# Patient Record
(demographics unavailable — no encounter records)

---

## 2025-01-14 NOTE — HISTORY OF PRESENT ILLNESS
[FreeTextEntry1] : Ms. Gissel Denis was seen at the [facility] located in [place], [place], on 2025. The patient is a 33-year-old woman with gestational hypertension as diagnosed per OBGYN team. She was referred specifically for blood pressure management.   I had the opportunity of meeting Gissel for the first time today in clinic.   She was admitted to [facility] from 2025 to 2025 for induction of labor for Non reassuring fetal tracing. She underwent  delivery on 1/3/2025. She was discharged on nifedipine 60 mg daily. She was reportedly diagnosed with gestation hypertension.  She has a baby girl.   No prior reported (or documented) history myocardial infarction, coronary artery disease, arrhythmias, heart failure, myocarditis, pericarditis or valvular disease. No prior reported (or documented) history of diabetes mellitus or lipid disorder. Other past medical and surgical history listed below.   Occupation includes accounting. See other social and family history below.  Exercise: HIIT prior to this most recent pregnancy.

## 2025-01-14 NOTE — REASON FOR VISIT
[Hypertension] : hypertension [Spouse] : spouse [FreeTextEntry3] : Mark Morrison MD [FreeTextEntry1] : History was provided by patient and chart review.

## 2025-01-14 NOTE — CARDIOLOGY SUMMARY
[de-identified] : 1/14/2025 showed sinus rhythm with a ventricular rate of 90 bpm. IL interval 142 ms and QRS duration of 80 ms. Normal axis and intervals. QTc 422 ms. No chamber enlargement or hypertrophy. No ST/T changes.

## 2025-01-14 NOTE — DISCUSSION/SUMMARY
[EKG obtained to assist in diagnosis and management of assessed problem(s)] : EKG obtained to assist in diagnosis and management of assessed problem(s) [FreeTextEntry1] : Ms. Gissel Denis is a 33-year-old woman with  1. Reported gestational hypertension post CS on 1/3/2025. She was referred specifically for blood pressure management. She was diagnosed by OBGYN team at [facility].   - The patient appears to be doing well from a cardiovascular standpoint and denies angina. There is no evidence of acute decompensated heart failure or clinically significant arrhythmia or valvular heart disease at this time. NYHA Class I. The patient is a non-smoker. There is no reported family history of premature coronary artery disease. TTE not indicated at this time as she is asymptomatic and there is no murmur on exam. Her ECG is also normal.    - Her blood pressure today is fairly controlled. My nurse team will call her back in 1 week for a blood pressure check. I educated her on proper BP recording technique. Continue nifedipine 60 mg daily which is safe for breastfeeding. Gestational HTN is associated with elevated cardiovascular events later in life such as ischemic heart disease, stroke, heart failure and the development of diabetes mellitus.  - I recommended lifestyle modifications for long-term cardiovascular health, including adopting a Mediterranean diet pattern to lower cardiovascular disease risk. I discussed the importance of medication adherence. I recommended that the patient aim for at least 150 minutes per week of moderate-intensity exercise (e.g., brisk walking, swimming) or 75 minutes of vigorous activity (e.g., running, cycling) once cleared by OBGYN. I reviewed cardiac alarm symptoms with the patient, including (but not limited to) chest discomfort, shortness of breath, lightheadedness, syncope, and unusual sweating. The patient understands the importance of calling 911 or going to the nearest emergency room if they experience any of these symptoms.   - I recommend a follow-up appointment with me in 1 month. I will check lipids, A1c and Lp(a) around that time.    The patient agreed with the above recommendations and was appreciative of the care provided. The patient should continue seeing their primary care physician for overall health maintenance. I am happy to answer any further questions so please call my office if needed. Thank you for the opportunity to participate in the care of Ms. Denis.   Yakov Beckford DO, [facility] Cardio-Obstetrics Cardiologist Adult Congenital Heart Disease Cardiologist [address], [address] [place] [address] Office telephone number [phone]

## 2025-02-14 NOTE — HISTORY OF PRESENT ILLNESS
[FreeTextEntry1] : Ms. Gissel Denis was seen at the [facility] located in [place], [place], on 2025. The patient is a 33-year-old woman with gestational hypertension as diagnosed per OBGYN team. She is s/p  delivery on 1/3/2025. I last evaluated her on 2025 and recommended she continue nifedipine 60 mg daily. I recommended a 1 month follow up. She returns for follow up. She is now off of her BP med.   Patient denies fevers, fatigue, snoring, lightheadedness, near syncope, syncope, headache, vision disturbance, chest pain, palpitations, dyspnea, edema, orthopnea, or PND.   Family history: patient reports no knowledge of premature CAD, SCD, device therapies or congenital heart disease in the family. There is a history of MI in her maternal grandfather and mother has HTN.  Social history: patient denies smoking or using illicit drugs. Socially drinks. .  CV meds: none Exercise: HIIT prior to this most recent pregnancy.

## 2025-02-14 NOTE — DISCUSSION/SUMMARY
[FreeTextEntry1] : Ms. Gissel Denis is a 33-year-old woman with 1. Reported gestational hypertension post CS on 1/3/2025.   - The patient appears to be doing well from a cardiovascular standpoint and denies angina. NYHA Class I. The patient is a non-smoker. There is no reported family history of premature coronary artery disease. Mother has HTN.   - Her blood pressure today is controlled. She is on no BP meds. Gestational HTN is associated with elevated cardiovascular events later in life such as ischemic heart disease, stroke, heart failure and the development of diabetes mellitus. I would like to continue to follow her every 24 months and she can be seen by her PCP annually.   - I recommended lifestyle modifications for long-term cardiovascular health and return precautions given.  - I recommend a follow-up appointment with me in 24 months.  I will check lipids, A1c and Lp(a) now.   The patient agreed with the above recommendations and was appreciative of the care provided. The patient should continue seeing their primary care physician for overall health maintenance. I am happy to answer any further questions so please call my office if needed. Thank you for the opportunity to participate in the care of Ms. Denis.  Yakov Beckford DO, [facility] Cardio-Obstetrics Cardiologist Adult Congenital Heart Disease Cardiologist [address], [place] [address] Office telephone number [phone].

## 2025-02-14 NOTE — REASON FOR VISIT
[Hypertension] : hypertension [FreeTextEntry3] : Mark Morrison MD [FreeTextEntry1] : History was provided by patient and chart review. She was unaccompanied during the visit.